# Patient Record
Sex: FEMALE | Employment: STUDENT | URBAN - METROPOLITAN AREA
[De-identification: names, ages, dates, MRNs, and addresses within clinical notes are randomized per-mention and may not be internally consistent; named-entity substitution may affect disease eponyms.]

---

## 2020-01-06 ENCOUNTER — OFFICE VISIT (OUTPATIENT)
Dept: URGENT CARE | Facility: URGENT CARE | Age: 20
End: 2020-01-06
Payer: COMMERCIAL

## 2020-01-06 VITALS
RESPIRATION RATE: 18 BRPM | HEART RATE: 91 BPM | BODY MASS INDEX: 40.82 KG/M2 | HEIGHT: 66 IN | WEIGHT: 254 LBS | DIASTOLIC BLOOD PRESSURE: 74 MMHG | SYSTOLIC BLOOD PRESSURE: 110 MMHG | TEMPERATURE: 98.6 F | OXYGEN SATURATION: 92 %

## 2020-01-06 DIAGNOSIS — H92.03 OTALGIA OF BOTH EARS: Primary | ICD-10-CM

## 2020-01-06 PROBLEM — E66.01 MORBID OBESITY (H): Status: ACTIVE | Noted: 2020-01-06

## 2020-01-06 PROCEDURE — 99213 OFFICE O/P EST LOW 20 MIN: CPT | Performed by: FAMILY MEDICINE

## 2020-01-06 RX ORDER — AZITHROMYCIN 250 MG/1
TABLET, FILM COATED ORAL
Qty: 6 TABLET | Refills: 0 | Status: SHIPPED | OUTPATIENT
Start: 2020-01-06 | End: 2020-01-15

## 2020-01-06 RX ORDER — VENLAFAXINE HYDROCHLORIDE 150 MG/1
150 CAPSULE, EXTENDED RELEASE ORAL
COMMUNITY
Start: 2018-01-04 | End: 2020-03-09

## 2020-01-06 ASSESSMENT — MIFFLIN-ST. JEOR: SCORE: 1937.86

## 2020-01-06 NOTE — PROGRESS NOTES
"SUBJECTIVE:  Rosa Maria Lopez is a 19 year old female who presents with bilateral ear pain for 1 week(s).   Severity: moderate   Timing:sudden onset  Additional symptoms include cough.      History of recurrent otitis: no    History reviewed. No pertinent past medical history.  Current Outpatient Medications   Medication Sig Dispense Refill     venlafaxine (EFFEXOR-XR) 150 MG 24 hr capsule Take 150 mg by mouth       Social History     Tobacco Use     Smoking status: Never Smoker     Smokeless tobacco: Never Used   Substance Use Topics     Alcohol use: Not on file       ROS:   INTEGUMENTARY/SKIN: NEGATIVE for worrisome rashes, moles or lesions  EYES: NEGATIVE for vision changes or irritation    OBJECTIVE:  /74 (BP Location: Right arm, Patient Position: Sitting, Cuff Size: Adult Large)   Pulse 91   Temp 98.6  F (37  C) (Tympanic)   Resp 18   Ht 1.683 m (5' 6.25\")   Wt 115.2 kg (254 lb)   LMP 12/17/2019 (Approximate)   SpO2 92%   BMI 40.69 kg/m     EXAM:  The right TM is bulging and erythematous     The right auditory canal is normal and without drainage, edema or erythema  The left TM is bulging and erythematous  The left auditory canal is normal and without drainage, edema or erythema  Oropharynx exam is normal: no lesions, erythema, adenopathy or exudate.  GENERAL: no acute distress  EYES: EOMI,  PERRL, conjunctiva clear  NECK: supple, non-tender to palpation, no adenopathy noted  RESP: lungs clear to auscultation - no rales, rhonchi or wheezes  CV: regular rates and rhythm, normal S1 S2, no murmur noted  SKIN: no suspicious lesions or rashes     ASSESSMENT:  Otalgia    PLAN:  abx and time   Pt instructed to come back to the clinic for worsening sx    See orders in Epic    "

## 2020-01-06 NOTE — NURSING NOTE
"Chief Complaint   Patient presents with     Ear Problem     Right ear 3-4 days. Left ear since last night       Initial /74 (BP Location: Right arm, Patient Position: Sitting, Cuff Size: Adult Large)   Pulse 91   Temp 98.6  F (37  C) (Tympanic)   Resp 18   Ht 1.683 m (5' 6.25\")   Wt 115.2 kg (254 lb)   LMP 12/17/2019 (Approximate)   SpO2 92%   BMI 40.69 kg/m   Estimated body mass index is 40.69 kg/m  as calculated from the following:    Height as of this encounter: 1.683 m (5' 6.25\").    Weight as of this encounter: 115.2 kg (254 lb).    Patient presents to the clinic using No DME    Health Maintenance that is potentially due pending provider review:  NONE    n/a    Is there anyone who you would like to be able to receive your results? No  If yes have patient fill out MORGAN    Yasmin Jaramillo CMA    "

## 2020-01-15 ENCOUNTER — OFFICE VISIT (OUTPATIENT)
Dept: FAMILY MEDICINE | Facility: CLINIC | Age: 20
End: 2020-01-15
Payer: COMMERCIAL

## 2020-01-15 VITALS
OXYGEN SATURATION: 98 % | WEIGHT: 253 LBS | RESPIRATION RATE: 15 BRPM | HEART RATE: 86 BPM | BODY MASS INDEX: 40.66 KG/M2 | HEIGHT: 66 IN | SYSTOLIC BLOOD PRESSURE: 114 MMHG | TEMPERATURE: 99 F | DIASTOLIC BLOOD PRESSURE: 84 MMHG

## 2020-01-15 DIAGNOSIS — H69.93 DYSFUNCTION OF BOTH EUSTACHIAN TUBES: Primary | ICD-10-CM

## 2020-01-15 PROBLEM — G43.909 MIGRAINES: Status: ACTIVE | Noted: 2019-08-28

## 2020-01-15 PROBLEM — E28.2 BILATERAL POLYCYSTIC OVARIAN SYNDROME: Status: ACTIVE | Noted: 2017-03-19

## 2020-01-15 PROCEDURE — 99213 OFFICE O/P EST LOW 20 MIN: CPT | Performed by: NURSE PRACTITIONER

## 2020-01-15 RX ORDER — FLUTICASONE PROPIONATE 50 MCG
2 SPRAY, SUSPENSION (ML) NASAL DAILY
Qty: 16 G | Refills: 3 | Status: SHIPPED | OUTPATIENT
Start: 2020-01-15

## 2020-01-15 ASSESSMENT — MIFFLIN-ST. JEOR: SCORE: 1933.32

## 2020-01-15 NOTE — PATIENT INSTRUCTIONS
1.  Use flonase daily as directed for 1 week.  If symptoms persist despite this treatment and time for healing, follow-up with appointment with ENT.

## 2020-01-15 NOTE — PROGRESS NOTES
Rosa Maria Lopez is a 19 year old female who presents to clinic today for the following health issues:    HPI   ED/UC Followup:    Facility:  [facility]   Date of visit: 1/6/2020  Reason for visit: Otalgia of both ears   Current Status: ears are still hurting and fever came back 2 days ago    101.3 - 2 nights ago  Last dose of Zpak was a couple days ago.  C/O symptoms of pressure in bilateral ears    Patient Active Problem List   Diagnosis     Morbid obesity (H)     Social anxiety disorder     Severe recurrent major depressive disorder with psychotic features (H)     Recurrent UTI (urinary tract infection)     Personal history of other mental and behavioral disorders     Obstructive sleep apnea syndrome     Migraines     Menorrhagia     Insomnia due to mental disorder     Herpes simplex     Chronic bilateral low back pain without sciatica     Bilateral polycystic ovarian syndrome     ADHD (attention deficit hyperactivity disorder), combined type     History reviewed. No pertinent surgical history.    Social History     Tobacco Use     Smoking status: Never Smoker     Smokeless tobacco: Never Used   Substance Use Topics     Alcohol use: Not on file     History reviewed. No pertinent family history.      Current Outpatient Medications   Medication Sig Dispense Refill     fluticasone (FLONASE) 50 MCG/ACT nasal spray Spray 2 sprays into both nostrils daily 16 g 3     venlafaxine (EFFEXOR-XR) 150 MG 24 hr capsule Take 150 mg by mouth       Allergies   Allergen Reactions     Oseltamivir Anaphylaxis, Hives, Rash and Swelling     Other reaction(s): Rash  Developed diffuse urticaria 3 days after starting Tamiflu. No other obvious allergen found by history.   Other reaction(s): Hives, Swelling  Swelling of throat.  Developed diffuse urticaria 3 days after starting Tamiflu. No other obvious allergen found by history.   Swelling of throat.  Other reaction(s): Hives, Swelling  Swelling of throat.  Developed  "diffuse urticaria 3 days after starting Tamiflu. No other obvious allergen found by history.   Developed diffuse urticaria 3 days after starting Tamiflu. No other obvious allergen found by history.        Penicillins Hives and Rash     Other reaction(s): Unknown Reaction     Sulfa Drugs Anaphylaxis     Other reaction(s): Unknown Reaction     Dust Mites      Other reaction(s): Unknown Reaction  Seasonal     Nuts Swelling     Other reaction(s): Edema  Other reaction(s): Edema       Olive Oil      Olive Extract     Doxycycline Nausea and Vomiting     Other reaction(s): Vomiting  Other reaction(s): Vomiting  Other reaction(s): Vomiting       Pineapple Rash     Other reaction(s): Rash     Reviewed and updated as needed this visit by Provider  Tobacco  Allergies  Meds  Problems  Med Hx  Surg Hx  Fam Hx         Review of Systems   ROS COMP: CONSTITUTIONAL: NEGATIVE for fever, chills, change in weight  ENT/MOUTH: POSITIVE for pressure in bilateral ears otherwise no other complaints  RESP: NEGATIVE for significant cough or SOB  CV: NEGATIVE for chest pain, palpitations or peripheral edema  PSYCHIATRIC: NEGATIVE for changes in mood or affect  ROS otherwise negative      Objective    /84   Pulse 86   Temp 99  F (37.2  C) (Tympanic)   Resp 15   Ht 1.683 m (5' 6.25\")   Wt 114.8 kg (253 lb)   LMP 12/17/2019 (Approximate)   SpO2 98%   BMI 40.53 kg/m    Body mass index is 40.53 kg/m .  Physical Exam   GENERAL: healthy, alert and no distress  HENT: normal cephalic/atraumatic, right ear: bulging membrane, left ear: bulging membrane, nose and mouth without ulcers or lesions, oropharynx clear and oral mucous membranes moist  NECK: no adenopathy and no asymmetry, masses, or scars  RESP: lungs clear to auscultation - no rales, rhonchi or wheezes  CV: regular rate and rhythm, normal S1 S2, no S3 or S4, no murmur, click or rub, no peripheral edema and peripheral pulses strong  PSYCH: mentation appears normal, affect " normal/bright    Diagnostic Test Results:  none         Assessment & Plan     1. Dysfunction of both eustachian tubes  Treating with Flonase to see if this helps reduce pressure in canals.  No need for any antibiotics since infection is cleared.  Follow-up with ENT if any persistent symptoms for evaluation.  - fluticasone (FLONASE) 50 MCG/ACT nasal spray; Spray 2 sprays into both nostrils daily  Dispense: 16 g; Refill: 3  - OTOLARYNGOLOGY REFERRAL     See Patient Instructions    Return in about 1 week (around 1/22/2020), or if symptoms worsen or fail to improve.    Leatha Witt, MINDI  [facility]